# Patient Record
Sex: MALE | Race: WHITE
[De-identification: names, ages, dates, MRNs, and addresses within clinical notes are randomized per-mention and may not be internally consistent; named-entity substitution may affect disease eponyms.]

---

## 2018-06-22 ENCOUNTER — HOSPITAL ENCOUNTER (OUTPATIENT)
Dept: HOSPITAL 47 - RADUSMAIN | Age: 33
Discharge: HOME | End: 2018-06-22
Attending: UROLOGY
Payer: COMMERCIAL

## 2018-06-22 DIAGNOSIS — R10.32: ICD-10-CM

## 2018-06-22 DIAGNOSIS — R10.31: Primary | ICD-10-CM

## 2018-06-22 DIAGNOSIS — R35.0: ICD-10-CM

## 2018-06-22 PROCEDURE — 76770 US EXAM ABDO BACK WALL COMP: CPT

## 2018-06-22 PROCEDURE — 74018 RADEX ABDOMEN 1 VIEW: CPT

## 2018-06-22 NOTE — XR
EXAMINATION TYPE: XR KUB

 

DATE OF EXAM: 6/22/2018

 

COMPARISON: NONE

 

HISTORY: Flank pain

 

TECHNIQUE: 2 views supine

 

FINDINGS: There is no sign of intestinal obstruction or pneumoperitoneum. Fecal pattern is normal. Th
ere is no evidence of a mass. There are phleboliths in the pelvis. Sacroiliac joints appear normal. T
here are no pathologic calcifications over the kidneys.

 

IMPRESSION: Nonacute abdomen.

## 2020-01-27 ENCOUNTER — HOSPITAL ENCOUNTER (OUTPATIENT)
Dept: HOSPITAL 47 - RADUSMAIN | Age: 35
Discharge: HOME | End: 2020-01-27
Attending: FAMILY MEDICINE
Payer: COMMERCIAL

## 2020-01-27 DIAGNOSIS — N20.0: Primary | ICD-10-CM

## 2020-01-27 PROCEDURE — 76770 US EXAM ABDO BACK WALL COMP: CPT

## 2020-01-28 NOTE — US
EXAMINATION TYPE: US renals and bladder

 

DATE OF EXAM: 1/27/2020

 

COMPARISON: US 6/22/2018

 

CLINICAL HISTORY: N20.0 Kidney stones.

 

EXAM MEASUREMENTS:

 

Right Kidney:  11.6 x 5.1 x 6.1 cm

Left Kidney: 11.8 x 5.9 x 6.1 cm

Right Kidney: small amount of fluid seen in renal pelvis. No hydronephrosis. 

Left Kidney: No hydronephrosis or masses seen  

Bladder: wnl

**Bilateral Jets seen: Yes

 

There is no evidence for hydronephrosis at this point in time.  No nephrolithiasis is seen.  No cecy
s are identified.  The urinary bladder is anechoic.  Bilateral ureteral jets are seen.

 

 

IMPRESSION: Unremarkable renal ultrasound. No hydronephrosis. No nephrolithiasis sonographically of e
ither kidney.

## 2022-04-15 ENCOUNTER — HOSPITAL ENCOUNTER (OUTPATIENT)
Dept: HOSPITAL 47 - RADUSWWP | Age: 37
Discharge: HOME | End: 2022-04-15
Attending: OBSTETRICS & GYNECOLOGY
Payer: MEDICAID

## 2022-04-15 DIAGNOSIS — N50.3: ICD-10-CM

## 2022-04-15 DIAGNOSIS — Z01.818: Primary | ICD-10-CM

## 2022-04-15 DIAGNOSIS — I86.1: ICD-10-CM

## 2022-04-15 LAB
ALBUMIN SERPL-MCNC: 4.5 G/DL (ref 3.8–4.9)
FSH SERPL-ACNC: 2.2 MIU/ML

## 2022-04-15 PROCEDURE — 84403 ASSAY OF TOTAL TESTOSTERONE: CPT

## 2022-04-15 PROCEDURE — 84270 ASSAY OF SEX HORMONE GLOBUL: CPT

## 2022-04-15 PROCEDURE — 84165 PROTEIN E-PHORESIS SERUM: CPT

## 2022-04-15 PROCEDURE — 93975 VASCULAR STUDY: CPT

## 2022-04-15 PROCEDURE — 83001 ASSAY OF GONADOTROPIN (FSH): CPT

## 2022-04-15 PROCEDURE — 83002 ASSAY OF GONADOTROPIN (LH): CPT

## 2022-04-15 PROCEDURE — 82670 ASSAY OF TOTAL ESTRADIOL: CPT

## 2022-04-15 PROCEDURE — 82040 ASSAY OF SERUM ALBUMIN: CPT

## 2022-04-15 PROCEDURE — 76870 US EXAM SCROTUM: CPT

## 2022-04-15 NOTE — US
EXAMINATION TYPE: US scrotum with doppler.  Grayscale and color Doppler Duplex imaging performed of t
jennifer scrotum.

 

DATE OF EXAM: 4/15/2022

 

COMPARISON: NONE

 

CLINICAL HISTORY: E29.1 TESTICULAR HYPOFUNCTION.

 

 

EXAM MEASUREMENTS:

 

TESTICLES:

Right Testicle:  3.7 x 2.0 x 2.8 cm

Left Testicle:  4.7 x 2.5 x 3.2 cm

 

EPIDIDYMIS HEAD:

Right Epididymis:  0.8 cm

Left Epididymis:  1.2 cm  Left cystic cluster measuring 0.5cm

 

Doppler performed to assess for testicular vascularity; good bilateral color flow and waveforms are s
een.   

 

Presence of hydroceles:  no

Presence of varicoceles:  Left shows slightly dilated vessels.

 

Testicles are symmetric and normal in size. No concerning focal intratesticular mass.

 

 

 

IMPRESSION: As above.

## 2023-02-05 NOTE — US
EXAMINATION TYPE: US kidneys/renal and bladder

 

DATE OF EXAM: 6/22/2018

 

COMPARISON: NONE

 

CLINICAL HISTORY: Flank Pain R10.31   R10.32. Frequency of urination

 

EXAM MEASUREMENTS:

 

Right Kidney:  11.5 x 5.2 x 7.5  cm

Left Kidney: 11.6 x 6.5 x 6.8 cm

Post Void Residual Volume:  42.0 mL

 

 

 

Right Kidney: fluid noted in renal pelvis, even post void  

Left Kidney: No hydronephrosis or masses seen  

Bladder: wnl

**Bilateral Jets seen: Yes

**Normal Post Void Residual: yes

 

There is no evidence for hydronephrosis at this point in time.  No nephrolithiasis is seen.  .  The u
rinary bladder is anechoic.  Bilateral ureteral jets are seen.

 

 

 

IMPRESSION:

There is probably a 1.6 cm right renal parapelvic cyst. No evidence of renal obstruction. No evidence
 of solid renal mass.
Never

## 2024-10-01 ENCOUNTER — HOSPITAL ENCOUNTER (OUTPATIENT)
Dept: HOSPITAL 47 - LABWHC1 | Age: 39
Discharge: HOME | End: 2024-10-01
Attending: FAMILY MEDICINE
Payer: MEDICAID

## 2024-10-01 DIAGNOSIS — E87.6: Primary | ICD-10-CM

## 2024-10-01 PROCEDURE — 80048 BASIC METABOLIC PNL TOTAL CA: CPT

## 2024-10-01 PROCEDURE — 36415 COLL VENOUS BLD VENIPUNCTURE: CPT

## 2024-10-02 LAB
ANION GAP SERPL CALC-SCNC: 15.1 MMOL/L (ref 4–12)
BUN SERPL-SCNC: 13.7 MG/DL (ref 9–27)
BUN/CREAT SERPL: 13.7 RATIO (ref 12–20)
CALCIUM SPEC-MCNC: 9.7 MG/DL (ref 8.7–10.3)
CHLORIDE SERPL-SCNC: 98 MMOL/L (ref 96–109)
CO2 SERPL-SCNC: 27.9 MMOL/L (ref 21.6–31.8)
GLUCOSE SERPL-MCNC: 91 MG/DL (ref 70–110)
POTASSIUM SERPL-SCNC: 3.3 MMOL/L (ref 3.5–5.5)
SODIUM SERPL-SCNC: 141 MMOL/L (ref 135–145)